# Patient Record
Sex: MALE | Race: BLACK OR AFRICAN AMERICAN
[De-identification: names, ages, dates, MRNs, and addresses within clinical notes are randomized per-mention and may not be internally consistent; named-entity substitution may affect disease eponyms.]

---

## 2018-05-02 ENCOUNTER — HOSPITAL ENCOUNTER (EMERGENCY)
Dept: HOSPITAL 80 - FED | Age: 33
Discharge: HOME | End: 2018-05-02
Payer: MEDICAID

## 2018-05-02 VITALS — DIASTOLIC BLOOD PRESSURE: 96 MMHG | SYSTOLIC BLOOD PRESSURE: 122 MMHG

## 2018-05-02 DIAGNOSIS — R04.0: Primary | ICD-10-CM

## 2018-05-02 PROCEDURE — 2Y41X5Z PACKING OF NASAL REGION USING PACKING MATERIAL: ICD-10-PCS | Performed by: EMERGENCY MEDICINE

## 2018-05-02 NOTE — EDPHY
H & P


Time Seen by Provider: 05/02/18 19:14


HPI/ROS: 





CHIEF COMPLAINT:  Epistaxis





HISTORY OF PRESENT ILLNESS:  Patient is a 32-year-old man who comes to the 

emergency department complaining of right-sided epistaxis.  He states that it 

started on the right side but when he tries to plug his nose it overflowed to 

the left as well.  He also feels in his ears.  No trauma.  No fever.  He has 

had nosebleeds before but never this bad.  No headache.  No fever.








REVIEW OF SYSTEMS:


Constitutional:  denies: chills, fever, recent illness, recent injury


EENTM:  See HPI 


Respiratory: denies: cough, shortness of breath


Cardiac: denies: chest pain, irregular heart rate, lightheadedness, palpitations


Gastrointestinal/Abdominal: denies: abdominal pain, diarrhea, nausea, vomiting, 

blood streaked stools


Genitourinary: denies: dysuria, frequency, hematuria, pain


Musculoskeletal: denies: joint pain, muscle pain


Skin: denies: lesions, rash, jaundice, bruising


Neurological: denies: headache, numbness, paresthesia, tingling, dizziness, 

weakness


Hematologic/Lymphatic: denies: blood clots, easy bleeding, easy bruising


Immunologic/allergic: denies: HIV/AIDS, transplant








EXAM:


GENERAL:  Well-appearing, well-nourished and in no acute distress.


HEAD:  Atraumatic, normocephalic.


EYES:  Pupils equal round and reactive to light, extraocular movements intact, 

sclera anicteric, conjunctiva are normal.


ENT:  TMs blood filled on the right, anterior epistaxis right, oropharynx clear 

without exudates.  Moist mucous membranes.


NECK:  Normal range of motion, supple without lymphadenopathy or JVD.


LUNGS:  Breath sounds clear to auscultation bilaterally and equal.  No wheezes 

rales or rhonchi.


HEART:  Regular rate and rhythm without murmurs, rubs or gallops.


ABDOMEN:  Soft, nontender, normoactive bowel sounds.  No guarding, no rebound.  

No masses appreciated.


BACK:  No CVA tenderness, no spinal tenderness, step-offs or deformities


EXTREMITIES:  Normal range of motion, no pitting or edema.  No clubbing or 

cyanosis.


NEUROLOGICAL:  Cranial nerves II through XII grossly intact.  Normal speech, 

normal gait.  5/5 strength, normal movement in all extremities, normal sensation


PSYCH:  Normal mood, normal affect.


SKIN:  Warm, dry, normal turgor, no visible rashes or lesions.








Source: Patient, EMS


Exam Limitations: No limitations





- Medical/Surgical History


Hx Asthma: No


Hx Chronic Respiratory Disease: No


Hx Diabetes: No


Hx Cardiac Disease: No


Hx Renal Disease: No


Hx Cirrhosis: No


Hx Alcoholism: No


Hx HIV/AIDS: No


Other PMH: None





- Family History


Significant Family History: No pertinent family hx





- Social History


Smoking Status: Never smoked


Alcohol Use: Sober


Drug Use: None


Constitutional: 


 Initial Vital Signs











Temperature (C)  36.6 C   05/02/18 19:15


 


Heart Rate  115 H  05/02/18 19:15


 


Respiratory Rate  16   05/02/18 19:15


 


Blood Pressure  147/103 H  05/02/18 19:15


 


O2 Sat (%)  96   05/02/18 19:15








 











O2 Delivery Mode               Room Air














Allergies/Adverse Reactions: 


 





No Known Allergies Allergy (Unverified 05/02/18 19:30)


 








Home Medications: 














 Medication  Instructions  Recorded


 


Losartan Potassium  05/02/18


 


amLODIPine BESYLATE  05/02/18














Medical Decision Making


ED Course/Re-evaluation: 





7:50 p.m. I had the patient again clear his nose and used more Afrin and now 

cotton ball soaked in cocaine for anesthesia.  He did not clear his nose prior 

to the previous Afrin instillment.





9:30 p.m. the patient's bleeding has stopped.  We discussed leaving the packing 

in for 48 hr however he is quite reluctant to do this.  He states that it is 

slightly painful.  It has been coded with TXA so it is reasonable to try taking 

it out and see if the bleeding has stopped.  He requests that we do this.  The 

packing was removed successfully and we will observe.





10:00 p.m. the patient's bleeding has not started again.  He is eager to go.  

We discussed steps to take at home if his symptoms started again and 

indications for returning here.  He will sleep in a recliner.


Differential Diagnosis: 





Partial list of the Differential diagnosis considered include but were not 

limited to;  anterior epistaxis, posterior epistaxis and although unlikely 

based on the history and physical exam, I also considered coagulopathy, 

hemorrhage, trauma, infection.  I discussed these differential diagnoses and 

the plan with the patient as well as the usual and expected course.  The 

patient understands that the diagnosis is provisional and that in medicine we 

are not always correct and that further workup is often warranted.  Usual and 

customary warnings were given.  All of the patient's questions were answered.  

The patient was instructed to return to the emergency department should the 

symptoms at all worsen or return, otherwise to followup with the physician as 

we discussed.





- Data Points


Medications Given: 


 








Discontinued Medications





Acetaminophen (Tylenol)  1,000 mg PO EDNOW ONE


   Stop: 05/02/18 20:43


   Last Admin: 05/02/18 20:43 Dose:  1,000 mg


Cocaine HCl (Cocaine Hcl)  1 vannessa TP EDNOW ONE


   Stop: 05/02/18 19:16


   Last Admin: 05/02/18 20:09 Dose:  Not Given


Oxymetazoline HCl (Afrin Nasal Spray)  2 sprays EACHNARE EDNOW ONE


   Stop: 05/02/18 19:15


   Last Admin: 05/02/18 19:36 Dose:  2 sprays


Silver Nitrate/Potassium Nitrate (Silver Nitrate Applicator)  1 each TP EDNOW 

ONE


   Stop: 05/02/18 19:15


   Last Admin: 05/02/18 20:09 Dose:  Not Given


Tranexamic Acid (Cyklokapron)  500 mg TP EDNOW ONE


   Stop: 05/02/18 19:17


   Last Admin: 05/02/18 20:09 Dose:  Not Given








Departure





- Departure


Disposition: Home, Routine, Self-Care


Clinical Impression: 


 Right-sided epistaxis





Condition: Fair


Instructions:  Nosebleed (ED)


Referrals: 


Patient,NotPresent [Unknown] - As per Instructions


Mary Davison PA [Physician Assistant] - As per Instructions